# Patient Record
Sex: FEMALE | Race: BLACK OR AFRICAN AMERICAN | Employment: OTHER | URBAN - METROPOLITAN AREA
[De-identification: names, ages, dates, MRNs, and addresses within clinical notes are randomized per-mention and may not be internally consistent; named-entity substitution may affect disease eponyms.]

---

## 2017-11-24 ENCOUNTER — HOSPITAL ENCOUNTER (OUTPATIENT)
Dept: OTHER | Age: 72
Discharge: HOME | End: 2017-11-24
Attending: INTERNAL MEDICINE | Admitting: INTERNAL MEDICINE

## 2017-11-24 DIAGNOSIS — J18.9 PNEUMONIA DUE TO INFECTIOUS ORGANISM, UNSPECIFIED LATERALITY, UNSPECIFIED PART OF LUNG: ICD-10-CM

## 2019-07-11 ENCOUNTER — HOSPITAL ENCOUNTER (OUTPATIENT)
Dept: WOMENS IMAGING | Age: 74
Discharge: HOME | End: 2019-07-11
Payer: MEDICARE

## 2019-07-11 DIAGNOSIS — Z12.31 VISIT FOR SCREENING MAMMOGRAM: ICD-10-CM

## 2019-07-11 PROCEDURE — 77067 SCR MAMMO BI INCL CAD: CPT

## 2019-12-13 ENCOUNTER — HOSPITAL ENCOUNTER (OUTPATIENT)
Dept: GENERAL RADIOLOGY | Age: 74
Discharge: HOME | End: 2019-12-13
Payer: MEDICARE

## 2019-12-13 DIAGNOSIS — Z78.0 MENOPAUSE: ICD-10-CM

## 2019-12-13 PROCEDURE — 77080 DXA BONE DENSITY AXIAL: CPT

## 2022-06-21 ENCOUNTER — HOSPITAL ENCOUNTER (OUTPATIENT)
Dept: CT IMAGING | Age: 77
Discharge: HOME | End: 2022-06-21
Payer: MEDICARE

## 2022-06-21 DIAGNOSIS — M25.361 RIGHT KNEE BUCKLING: ICD-10-CM

## 2022-06-21 PROCEDURE — 73700 CT LOWER EXTREMITY W/O DYE: CPT

## 2022-11-17 ENCOUNTER — TELEPHONE (OUTPATIENT)
Dept: ORTHOPEDIC SURGERY | Age: 77
End: 2022-11-17

## 2022-11-17 NOTE — TELEPHONE ENCOUNTER
Orthopedic Nurse Navigator Summary    COVID:  Vaccinated and booster    Patient Name:  Chloé Martinez  Anticipated Date of Surgery:  11/30/22  Attended Pre-op Education Class:  Video sent to email  PCP: Sherie Yoder MD  Date of PCP visit for H&P: 11/17/22  Is patient in a Pain Management program:  Review of Medical history reveals history of: HTN, Anemia, Osteopenia    Critical Lab Values  - Hemoglobin (g/dL):  Date: 11/17/22 Value 12.0  - Hematocrit(%): Date: 11/17/22  Value 37.7  - HgbA1C:  Date:  Value  - Albumin:  Date: 11/17/22  Value 4.7  - BUN:  Date: 11/17/22  Value 16  - Creatinine:  Date: 11/17/22  Value 0.97    Coronary Artery Disease/HTN/CHF history: HTN- Patient sees Haseeb Lr MD for HTN- has appt 11/22/22  Cardiologist:  Cardiac clearance necessary:    Date of cardiac clearance appt:  Final Cardiac recommendations: On any anticoagulation: No    Diabetes History:  No  Most recent HgbA1C:  Pulmonary:  COPD/Emphysema/Use of home oxygen: No  Alcohol use: No    BMI greater than 40 at time of scheduling: Additional medical concerns:   Additional recommendations for above concerns:  Attended Pre-Hab program:    Anticipated Discharge Disposition:  Home with OPT  Who will be with patient at home following discharge:  her daughter lives with her  Equipment patient already has:  none  Bedroom on first or second floor:  first  Bathroom on first or second floor:  first  Weight bearing status:  wbat  Pre-op ambulatory status: painful ambulation  Number of entry steps:  none  Caregiver assistance:  full time    Emmie Simmons RN  Date:   11/17/22

## 2022-11-28 RX ORDER — POTASSIUM CHLORIDE 1500 MG/1
TABLET, FILM COATED, EXTENDED RELEASE ORAL
COMMUNITY
Start: 2022-09-16

## 2022-11-28 RX ORDER — ASPIRIN 81 MG/1
81 TABLET, CHEWABLE ORAL DAILY
COMMUNITY

## 2022-11-28 RX ORDER — ATENOLOL 100 MG/1
TABLET ORAL
COMMUNITY
Start: 2022-09-16

## 2022-11-28 RX ORDER — AMLODIPINE BESYLATE 10 MG/1
TABLET ORAL
COMMUNITY
Start: 2022-09-16

## 2022-11-28 NOTE — PROGRESS NOTES
Call to Dr Jackie Rivero office. Requested patient's H&P, EKG & lab results.  stated she will send them./jamil    11-29-22 @ [phone] - Called and requested preop testing,  stated it was faxed yesterday but will refax.  /MA    11-29-22 @ 18 - Called PCP office again for pre op testing. Verna Ruth

## 2022-11-28 NOTE — PROGRESS NOTES
[facility] PRE-SURGICAL TESTING INSTRUCTIONS                      PRIOR TO PROCEDURE DATE:    1. PLEASE FOLLOW ANY INSTRUCTIONS GIVEN TO YOU PER YOUR SURGEON. 2. Arrange for someone to drive you home and be with you for the first 24 hours after discharge for your safety after your procedure for which you received sedation. Ensure it is someone we can share information with regarding your discharge. NOTE: At this time ONLY 2 ADULTS may accompany you. MASKS are no longer required but they are encouraged. 3. You must contact your surgeon for instructions IF:  You are taking any blood thinners, aspirin, anti-inflammatory or vitamins. There is a change in your physical condition such as a cold, fever, rash, cuts, sores, or any other infection, especially near your surgical site. 4. Do not drink alcohol the day before or day of your procedure. Do not use any recreational marijuana at least 24 hours or street drugs (heroin, cocaine) at minimum 5 days prior to your procedure. 5. A Pre-Surgical History and Physical MUST be completed WITHIN 30 DAYS OR LESS prior to your procedure. by your Physician or an Urgent Care        THE DAY OF YOUR PROCEDURE:  1. Follow instructions for ARRIVAL TIME as DIRECTED BY YOUR SURGEON. 2. Enter the MAIN entrance from [address] and follow the signs to the free Parking [employer] or [employer] (offered free of charge 7 am-5pm). 3. Enter the Main Entrance of the hospital (do not enter from the lower level of the parking garage). Upon entrance, check in with the  at the surgical information desk on your LEFT. Bring your insurance card and photo ID to register      4. DO NOT EAT ANYTHING 8 hours prior to arrival for surgery. You may have up to 8 ounces of water 4 hours prior to your arrival for surgery.    NOTE: ALL Gastric, Bariatric & Bowel surgery patients - you MUST follow your surgeon's instructions regarding eating/ drinking as you will have very specific instructions to follow. If you did not receive these, call your surgeon's office immediately. 5. MEDICATIONS:  Take the following medications with a SMALL sip of water: AMLODIPINE & TENORMIN  Use your usual dose of inhalers the morning of surgery. BRING your rescue inhaler with you to hospital.   Anesthesia does NOT want you to take insulin the morning of surgery. They will control your blood sugar while you are at the hospital. Please contact your ordering physician for instructions regarding your insulin the night before your procedure. If you have an insulin pump, please keep it set on basal rate. Bariatric patient's call your surgeon if on diabetic medications as some may need to be stopped 1 week prior to surgery    6. Do not swallow additional water when brushing teeth. No gum, candy, mints, or ice chips. Refrain from smoking or at least decrease the amount on day of surgery. 7. Morning of surgery:   Take a shower with an antibacterial soap (i.e., Safeguard or Dial) OR your physician may have instructed you to use Hibiclens. Dress in loose, comfortable clothing appropriate for redressing after your procedure. Do not wear jewelry (including body piercings), NO make-up (especially NO eye make-up including no mascara; no false eyelashes). Please remove fingernail polish. If you have acrylic nails, please remove the polish off of one finger so the oxygen monitoring equipment can do its job. NO toenail polish if foot/leg surgery. Do not apply any lotion, powders or oils. Remove all metal hair clips. Do not shave or wax for 72 hours prior to procedure near your operative site. Shaving with a razor can irritate your skin and make it easier to develop an infection. On the day of your procedure, any hair that needs to be removed near the surgical site will be 'clipped' by a healthcare worker using a special clipper designed to avoid skin irritation.     8. Dentures, glasses, or contacts will need to be removed before your procedure. Bring cases for your glasses, contacts, dentures, or hearing aids to protect them while you are in surgery. 9. If you use a CPAP, please bring it with you on the day of your procedure. 10. We recommend that valuable personal belongings such as cash, cell phones, e-tablets, or jewelry, be left at home during your stay. The hospital will not be responsible for valuables that are not secured in the hospital safe. However, if your insurance requires a co-pay, you may want to bring a method of payment, i.e., Check or credit card, if you wish to pay your co-pay the day of surgery. 11. If you are to stay overnight, you may bring a bag with personal items. Please have any large items you may need brought in by your family after your arrival to your hospital room. 12. If you have a Living Will or Durable Power of , please bring a copy on the day of your procedure. How we keep you safe and work to prevent surgical site infections:   1. Health care workers should always check your ID bracelet to verify your name and birth date. You will be asked many times to state your name, date of birth, and allergies. 2. Health care workers should always clean their hands with soap or alcohol gel before providing care to you. It is okay to ask anyone if they cleaned their hands before they touch you. 3. You will be actively involved in verifying the type of procedure you are having and ensuring the correct surgical site. This will be confirmed multiple times prior to your procedure. Do NOT reyes your surgery site UNLESS instructed to by your surgeon. 4. When you are in the operating room, your surgical site will be cleansed with a special soap, and in most cases, you will be given an antibiotic before the surgery begins. What to expect AFTER your procedure? 1.  Immediately following your procedure, your will be taken to the PACU for the first phase of your recovery. Your nurse will help you recover from any potential side effects of anesthesia, such as extreme drowsiness, changes in your vital signs or breathing patterns. Nausea, headache, muscle aches, or sore throat may also occur after anesthesia. Your nurse will help you manage these potential side effects. 2. For comfort and safety, arrange to have someone at home with you for the first 24 hours after discharge. 3. You and your family will be given written instructions about your diet, activity, dressing care, medications, and return visits. 4. Once at home, should issues with nausea, pain, or bleeding occur, or should you notice any signs of infection, you should call your surgeon. 5. Always clean your hands before and after caring for your wound. Do not let your family touch your surgery site without cleaning their hands. 6. Narcotic pain medications can cause significant constipation. You may want to add a stool softener to your postoperative medication schedule or speak to your surgeon on how best to manage this SIDE EFFECT. SPECIAL INSTRUCTIONS: FOLLOW ALL INSTRUCTIONS AS GIVEN TO YOU BY YOUR SURGEON    Thank you for allowing us to care for you. We strive to exceed your expectations in the delivery of care and service provided to you and your family. If you need to contact the [place] [address] staff for any reason, please call us at [phone]    Instructions reviewed with patient during preadmission testing phone interview.   Emma Mtz RN.11/28/2022 .9:12 AM      ADDITIONAL EDUCATIONAL INFORMATION REVIEWED PER PHONE WITH YOU AND/OR YOUR FAMILY:  Yes Hibiclens® Bathing Instructions   No Antibacterial Soap

## 2022-11-28 NOTE — PROGRESS NOTES
Place patient label inside box (if no patient label, complete below)  Name:  :  MR#:       Christian Brandt / PROCEDURE  I (we), Tonie Kayser  (Patient Name) authorize DR Ryan Morataya MD  (Provider / Cristal Galan) and/or such assistants as may be selected by him/her, to perform the following operation/procedure(s): RIGHT KNEE REVISION, POLY EXCHANGE       Note: If unable to obtain consent prior to an emergent procedure, document the emergent reason in the medical record. This procedure has been explained to my (our) satisfaction and included in the explanation was: The intended benefit, nature, and extent of the procedure to be performed; The significant risks involved and the probability of success; Alternative procedures and methods of treatment; The dangers and probable consequences of such alternatives (including no procedure or treatment); The expected consequences of the procedure on my future health; Whether other qualified individuals would be performing important surgical tasks and/or whether  would be present to advise or support the procedure. I (we) understand that there are other risks of infection and other serious complications in the pre-operative/procedural and postoperative/procedural stages of my (our) care. I (we) have asked all of the questions which I (we) thought were important in deciding whether or not to undergo treatment or diagnosis. These questions have been answered to my (our) satisfaction. I (we) understand that no assurance can be given that the procedure will be a success, and no guarantee or warranty of success has been given to me (us). It has been explained to me (us) that during the course of the operation/procedure, unforeseen conditions may be revealed that necessitate extension of the original procedure(s) or different procedure(s) than those set forth in Paragraph 1.  I (we) authorize and request that the above-named physician, his/her assistants or his/her designees, perform procedures as necessary and desirable if deemed to be in my (our) best interest.     Revised 8/2/2021                                                                          Page 1 of 2         I acknowledge that health care personnel may be observing this procedure for the purpose of medical education or other specified purposes as may be necessary as requested and/or approved by my (our) physician. I (we) consent to the disposal by the hospital Pathologist of the removed tissue, parts or organs in accordance with hospital policy. I do ____ do not ____ consent to the use of a local infiltration pain blocking agent that will be used by my provider/surgical provider to help alleviate pain during my procedure. I do ____ do not ____ consent to an emergent blood transfusion in the case of a life-threatening situation that requires blood components to be administered. This consent is valid for 24 hours from the beginning of the procedure. This patient does ____ or does not ____ currently have a DNR status/order. If DNR order is in place, obtain Addendum to the Surgical Consent for ALL Patients with a DNR Order to address alisia-operative status for limited intervention or DNR suspension.      I have read and fully understand the above Consent for Operation/Procedure and that all blanks were completed before I signed the consent.   _____________________________       _____________________      ____/____am/pm  Signature of Patient or legal representative      Printed Name / Relationship            Date / Time   ____________________________       _____________________      ____/____am/pm  Witness to Signature                                    Printed Name                    Date / Time    If patient is unable to sign or is a minor, complete the following)  Patient is a minor, ____ years of age, or unable to sign because: ______________________________________________________________________________________________    If a phone consent is obtained, consent will be documented by using two health care professionals, each affirming that the consenting party has no questions and gives consent for the procedure discussed with the physician/provider.   _____________________          ____________________       _____/_____am/pm   2nd witness to phone consent        Printed name           Date / Time    Informed Consent:  I have provided the explanation described above in section 1 to the patient and/or legal representative.  I have provided the patient and/or legal representative with an opportunity to ask any questions about the proposed operation/procedure.   ___________________________          ____________________         ____/____am/pm  Provider / Proceduralist                            Printed name            Date / Time  Revised 8/2/2021                                                                      Page 2 of 2

## 2022-11-30 ENCOUNTER — ANESTHESIA EVENT (OUTPATIENT)
Dept: OPERATING ROOM | Age: 77
End: 2022-11-30
Payer: MEDICARE

## 2022-11-30 ENCOUNTER — ANESTHESIA (OUTPATIENT)
Dept: OPERATING ROOM | Age: 77
End: 2022-11-30
Payer: MEDICARE

## 2022-11-30 ENCOUNTER — HOSPITAL ENCOUNTER (OUTPATIENT)
Age: 77
Setting detail: SURGERY ADMIT
Discharge: HOME | End: 2022-11-30
Attending: ORTHOPAEDIC SURGERY | Admitting: ORTHOPAEDIC SURGERY
Payer: MEDICARE

## 2022-11-30 VITALS
HEART RATE: 61 BPM | WEIGHT: 180 LBS | DIASTOLIC BLOOD PRESSURE: 71 MMHG | SYSTOLIC BLOOD PRESSURE: 136 MMHG | RESPIRATION RATE: 16 BRPM | TEMPERATURE: 98 F | HEIGHT: 62 IN | OXYGEN SATURATION: 94 % | BODY MASS INDEX: 33.13 KG/M2

## 2022-11-30 DIAGNOSIS — Z96.651 HISTORY OF TOTAL RIGHT KNEE REPLACEMENT: Primary | ICD-10-CM

## 2022-11-30 PROCEDURE — 2500000003 HC RX 250 WO HCPCS: Performed by: ORTHOPAEDIC SURGERY

## 2022-11-30 PROCEDURE — 3600000004 HC SURGERY LEVEL 4 BASE: Performed by: ORTHOPAEDIC SURGERY

## 2022-11-30 PROCEDURE — A4217 STERILE WATER/SALINE, 500 ML: HCPCS | Performed by: ORTHOPAEDIC SURGERY

## 2022-11-30 PROCEDURE — 7100000010 HC PHASE II RECOVERY - FIRST 15 MIN: Performed by: ORTHOPAEDIC SURGERY

## 2022-11-30 PROCEDURE — 3600000014 HC SURGERY LEVEL 4 ADDTL 15MIN: Performed by: ORTHOPAEDIC SURGERY

## 2022-11-30 PROCEDURE — 7100000001 HC PACU RECOVERY - ADDTL 15 MIN: Performed by: ORTHOPAEDIC SURGERY

## 2022-11-30 PROCEDURE — 2580000003 HC RX 258: Performed by: ANESTHESIOLOGY

## 2022-11-30 PROCEDURE — 2580000003 HC RX 258: Performed by: ORTHOPAEDIC SURGERY

## 2022-11-30 PROCEDURE — 7100000011 HC PHASE II RECOVERY - ADDTL 15 MIN: Performed by: ORTHOPAEDIC SURGERY

## 2022-11-30 PROCEDURE — 3700000001 HC ADD 15 MINUTES (ANESTHESIA): Performed by: ORTHOPAEDIC SURGERY

## 2022-11-30 PROCEDURE — 2500000003 HC RX 250 WO HCPCS: Performed by: NURSE ANESTHETIST, CERTIFIED REGISTERED

## 2022-11-30 PROCEDURE — 0SPV0JZ REMOVAL OF SYNTHETIC SUBSTITUTE FROM RIGHT KNEE JOINT, TIBIAL SURFACE, OPEN APPROACH: ICD-10-PCS | Performed by: ORTHOPAEDIC SURGERY

## 2022-11-30 PROCEDURE — 6360000002 HC RX W HCPCS: Performed by: NURSE ANESTHETIST, CERTIFIED REGISTERED

## 2022-11-30 PROCEDURE — 3700000000 HC ANESTHESIA ATTENDED CARE: Performed by: ORTHOPAEDIC SURGERY

## 2022-11-30 PROCEDURE — 1200000000 HC SEMI PRIVATE

## 2022-11-30 PROCEDURE — 7100000000 HC PACU RECOVERY - FIRST 15 MIN: Performed by: ORTHOPAEDIC SURGERY

## 2022-11-30 PROCEDURE — 0SRV0JA REPLACEMENT OF RIGHT KNEE JOINT, TIBIAL SURFACE WITH SYNTHETIC SUBSTITUTE, UNCEMENTED, OPEN APPROACH: ICD-10-PCS | Performed by: ORTHOPAEDIC SURGERY

## 2022-11-30 PROCEDURE — 2720000010 HC SURG SUPPLY STERILE: Performed by: ORTHOPAEDIC SURGERY

## 2022-11-30 PROCEDURE — 6360000002 HC RX W HCPCS: Performed by: ORTHOPAEDIC SURGERY

## 2022-11-30 PROCEDURE — 6370000000 HC RX 637 (ALT 250 FOR IP): Performed by: ORTHOPAEDIC SURGERY

## 2022-11-30 PROCEDURE — C1776 JOINT DEVICE (IMPLANTABLE): HCPCS | Performed by: ORTHOPAEDIC SURGERY

## 2022-11-30 PROCEDURE — 2709999900 HC NON-CHARGEABLE SUPPLY: Performed by: ORTHOPAEDIC SURGERY

## 2022-11-30 DEVICE — REVISION JOURNEY ARTICULAR INSERT                                    BCS STD 3-4 RT 10MM
Type: IMPLANTABLE DEVICE | Site: KNEE | Status: FUNCTIONAL
Brand: JOURNEY

## 2022-11-30 RX ORDER — OXYCODONE HYDROCHLORIDE 5 MG/1
5 TABLET ORAL PRN
Status: DISCONTINUED | OUTPATIENT
Start: 2022-11-30 | End: 2022-11-30 | Stop reason: HOSPADM

## 2022-11-30 RX ORDER — DIPHENHYDRAMINE HYDROCHLORIDE 50 MG/ML
12.5 INJECTION INTRAMUSCULAR; INTRAVENOUS
Status: DISCONTINUED | OUTPATIENT
Start: 2022-11-30 | End: 2022-11-30 | Stop reason: HOSPADM

## 2022-11-30 RX ORDER — OXYCODONE HYDROCHLORIDE 5 MG/1
5 TABLET ORAL EVERY 6 HOURS PRN
Qty: 28 TABLET | Refills: 0 | Status: SHIPPED | OUTPATIENT
Start: 2022-11-30 | End: 2022-12-07

## 2022-11-30 RX ORDER — PROPOFOL 10 MG/ML
INJECTION, EMULSION INTRAVENOUS PRN
Status: DISCONTINUED | OUTPATIENT
Start: 2022-11-30 | End: 2022-11-30 | Stop reason: SDUPTHER

## 2022-11-30 RX ORDER — ONDANSETRON 2 MG/ML
INJECTION INTRAMUSCULAR; INTRAVENOUS PRN
Status: DISCONTINUED | OUTPATIENT
Start: 2022-11-30 | End: 2022-11-30 | Stop reason: SDUPTHER

## 2022-11-30 RX ORDER — MEPERIDINE HYDROCHLORIDE 25 MG/ML
12.5 INJECTION INTRAMUSCULAR; INTRAVENOUS; SUBCUTANEOUS EVERY 5 MIN PRN
Status: DISCONTINUED | OUTPATIENT
Start: 2022-11-30 | End: 2022-11-30 | Stop reason: HOSPADM

## 2022-11-30 RX ORDER — AMOXICILLIN 250 MG
2 CAPSULE ORAL DAILY PRN
Qty: 30 TABLET | Refills: 2 | Status: SHIPPED | OUTPATIENT
Start: 2022-11-30

## 2022-11-30 RX ORDER — DEXAMETHASONE SODIUM PHOSPHATE 10 MG/ML
10 INJECTION, SOLUTION INTRAMUSCULAR; INTRAVENOUS ONCE
Status: COMPLETED | OUTPATIENT
Start: 2022-11-30 | End: 2022-11-30

## 2022-11-30 RX ORDER — LABETALOL HYDROCHLORIDE 5 MG/ML
10 INJECTION, SOLUTION INTRAVENOUS
Status: DISCONTINUED | OUTPATIENT
Start: 2022-11-30 | End: 2022-11-30 | Stop reason: HOSPADM

## 2022-11-30 RX ORDER — OXYCODONE HYDROCHLORIDE 5 MG/1
10 TABLET ORAL PRN
Status: DISCONTINUED | OUTPATIENT
Start: 2022-11-30 | End: 2022-11-30 | Stop reason: HOSPADM

## 2022-11-30 RX ORDER — SODIUM CHLORIDE, SODIUM LACTATE, POTASSIUM CHLORIDE, CALCIUM CHLORIDE 600; 310; 30; 20 MG/100ML; MG/100ML; MG/100ML; MG/100ML
INJECTION, SOLUTION INTRAVENOUS CONTINUOUS
Status: DISCONTINUED | OUTPATIENT
Start: 2022-11-30 | End: 2022-11-30 | Stop reason: HOSPADM

## 2022-11-30 RX ORDER — DEXAMETHASONE SODIUM PHOSPHATE 4 MG/ML
INJECTION, SOLUTION INTRA-ARTICULAR; INTRALESIONAL; INTRAMUSCULAR; INTRAVENOUS; SOFT TISSUE PRN
Status: DISCONTINUED | OUTPATIENT
Start: 2022-11-30 | End: 2022-11-30 | Stop reason: SDUPTHER

## 2022-11-30 RX ORDER — CELECOXIB 200 MG/1
400 CAPSULE ORAL ONCE
Status: COMPLETED | OUTPATIENT
Start: 2022-11-30 | End: 2022-11-30

## 2022-11-30 RX ORDER — SODIUM CHLORIDE 9 MG/ML
INJECTION, SOLUTION INTRAVENOUS PRN
Status: DISCONTINUED | OUTPATIENT
Start: 2022-11-30 | End: 2022-11-30 | Stop reason: HOSPADM

## 2022-11-30 RX ORDER — HYDRALAZINE HYDROCHLORIDE 20 MG/ML
10 INJECTION INTRAMUSCULAR; INTRAVENOUS
Status: DISCONTINUED | OUTPATIENT
Start: 2022-11-30 | End: 2022-11-30 | Stop reason: HOSPADM

## 2022-11-30 RX ORDER — MIDAZOLAM HYDROCHLORIDE 1 MG/ML
INJECTION INTRAMUSCULAR; INTRAVENOUS PRN
Status: DISCONTINUED | OUTPATIENT
Start: 2022-11-30 | End: 2022-11-30 | Stop reason: SDUPTHER

## 2022-11-30 RX ORDER — CEFADROXIL 500 MG/1
500 CAPSULE ORAL 2 TIMES DAILY
Qty: 14 CAPSULE | Refills: 0 | Status: SHIPPED | OUTPATIENT
Start: 2022-11-30 | End: 2022-12-07

## 2022-11-30 RX ORDER — SODIUM CHLORIDE 0.9 % (FLUSH) 0.9 %
5-40 SYRINGE (ML) INJECTION PRN
Status: DISCONTINUED | OUTPATIENT
Start: 2022-11-30 | End: 2022-11-30 | Stop reason: HOSPADM

## 2022-11-30 RX ORDER — ROCURONIUM BROMIDE 10 MG/ML
INJECTION, SOLUTION INTRAVENOUS PRN
Status: DISCONTINUED | OUTPATIENT
Start: 2022-11-30 | End: 2022-11-30 | Stop reason: SDUPTHER

## 2022-11-30 RX ORDER — SODIUM CHLORIDE 9 MG/ML
25 INJECTION, SOLUTION INTRAVENOUS PRN
Status: DISCONTINUED | OUTPATIENT
Start: 2022-11-30 | End: 2022-11-30 | Stop reason: HOSPADM

## 2022-11-30 RX ORDER — MAGNESIUM HYDROXIDE 1200 MG/15ML
LIQUID ORAL CONTINUOUS PRN
Status: COMPLETED | OUTPATIENT
Start: 2022-11-30 | End: 2022-11-30

## 2022-11-30 RX ORDER — GABAPENTIN 300 MG/1
300 CAPSULE ORAL ONCE
Status: COMPLETED | OUTPATIENT
Start: 2022-11-30 | End: 2022-11-30

## 2022-11-30 RX ORDER — GLYCOPYRROLATE 0.2 MG/ML
INJECTION INTRAMUSCULAR; INTRAVENOUS PRN
Status: DISCONTINUED | OUTPATIENT
Start: 2022-11-30 | End: 2022-11-30 | Stop reason: SDUPTHER

## 2022-11-30 RX ORDER — FENTANYL CITRATE 50 UG/ML
INJECTION, SOLUTION INTRAMUSCULAR; INTRAVENOUS PRN
Status: DISCONTINUED | OUTPATIENT
Start: 2022-11-30 | End: 2022-11-30 | Stop reason: SDUPTHER

## 2022-11-30 RX ORDER — SODIUM CHLORIDE 0.9 % (FLUSH) 0.9 %
5-40 SYRINGE (ML) INJECTION EVERY 12 HOURS SCHEDULED
Status: DISCONTINUED | OUTPATIENT
Start: 2022-11-30 | End: 2022-11-30 | Stop reason: HOSPADM

## 2022-11-30 RX ORDER — METOCLOPRAMIDE HYDROCHLORIDE 5 MG/ML
10 INJECTION INTRAMUSCULAR; INTRAVENOUS
Status: DISCONTINUED | OUTPATIENT
Start: 2022-11-30 | End: 2022-11-30 | Stop reason: HOSPADM

## 2022-11-30 RX ORDER — ASPIRIN 81 MG/1
81 TABLET ORAL 2 TIMES DAILY
Qty: 60 TABLET | Refills: 0 | Status: SHIPPED | OUTPATIENT
Start: 2022-11-30

## 2022-11-30 RX ORDER — LIDOCAINE HYDROCHLORIDE 10 MG/ML
1 INJECTION, SOLUTION EPIDURAL; INFILTRATION; INTRACAUDAL; PERINEURAL
Status: DISCONTINUED | OUTPATIENT
Start: 2022-11-30 | End: 2022-11-30 | Stop reason: HOSPADM

## 2022-11-30 RX ORDER — ACETAMINOPHEN 500 MG
1000 TABLET ORAL ONCE
Status: COMPLETED | OUTPATIENT
Start: 2022-11-30 | End: 2022-11-30

## 2022-11-30 RX ORDER — LIDOCAINE HYDROCHLORIDE 20 MG/ML
INJECTION, SOLUTION INTRAVENOUS PRN
Status: DISCONTINUED | OUTPATIENT
Start: 2022-11-30 | End: 2022-11-30 | Stop reason: SDUPTHER

## 2022-11-30 RX ADMIN — SODIUM CHLORIDE, POTASSIUM CHLORIDE, SODIUM LACTATE AND CALCIUM CHLORIDE: 600; 310; 30; 20 INJECTION, SOLUTION INTRAVENOUS at 08:58

## 2022-11-30 RX ADMIN — MIDAZOLAM HYDROCHLORIDE 2 MG: 2 INJECTION, SOLUTION INTRAMUSCULAR; INTRAVENOUS at 07:40

## 2022-11-30 RX ADMIN — ACETAMINOPHEN 1000 MG: 500 TABLET ORAL at 06:36

## 2022-11-30 RX ADMIN — GABAPENTIN 300 MG: 300 CAPSULE ORAL at 06:36

## 2022-11-30 RX ADMIN — CEFAZOLIN 2000 MG: 2 INJECTION, POWDER, FOR SOLUTION INTRAMUSCULAR; INTRAVENOUS at 07:40

## 2022-11-30 RX ADMIN — ROCURONIUM BROMIDE 50 MG: 10 INJECTION INTRAVENOUS at 07:44

## 2022-11-30 RX ADMIN — TRANEXAMIC ACID 1000 MG: 1 INJECTION, SOLUTION INTRAVENOUS at 07:54

## 2022-11-30 RX ADMIN — SODIUM CHLORIDE, POTASSIUM CHLORIDE, SODIUM LACTATE AND CALCIUM CHLORIDE: 600; 310; 30; 20 INJECTION, SOLUTION INTRAVENOUS at 06:47

## 2022-11-30 RX ADMIN — PROPOFOL 150 MG: 10 INJECTION, EMULSION INTRAVENOUS at 07:44

## 2022-11-30 RX ADMIN — DEXAMETHASONE SODIUM PHOSPHATE 4 MG: 4 INJECTION, SOLUTION INTRAMUSCULAR; INTRAVENOUS at 07:52

## 2022-11-30 RX ADMIN — ONDANSETRON 4 MG: 2 INJECTION INTRAMUSCULAR; INTRAVENOUS at 07:52

## 2022-11-30 RX ADMIN — LIDOCAINE HYDROCHLORIDE 50 MG: 20 INJECTION, SOLUTION INTRAVENOUS at 07:43

## 2022-11-30 RX ADMIN — CELECOXIB 400 MG: 200 CAPSULE ORAL at 06:36

## 2022-11-30 RX ADMIN — DEXAMETHASONE SODIUM PHOSPHATE 10 MG: 10 INJECTION, SOLUTION INTRAMUSCULAR; INTRAVENOUS at 06:47

## 2022-11-30 RX ADMIN — SUGAMMADEX 200 MG: 100 INJECTION, SOLUTION INTRAVENOUS at 09:02

## 2022-11-30 RX ADMIN — GLYCOPYRROLATE 0.2 MG: 0.2 INJECTION INTRAMUSCULAR; INTRAVENOUS at 07:42

## 2022-11-30 RX ADMIN — FENTANYL CITRATE 50 MCG: 50 INJECTION, SOLUTION INTRAMUSCULAR; INTRAVENOUS at 07:45

## 2022-11-30 ASSESSMENT — PAIN SCALES - GENERAL
PAINLEVEL_OUTOF10: 4
PAINLEVEL_OUTOF10: 3
PAINLEVEL_OUTOF10: 0
PAINLEVEL_OUTOF10: 4

## 2022-11-30 ASSESSMENT — PAIN - FUNCTIONAL ASSESSMENT
PAIN_FUNCTIONAL_ASSESSMENT: PREVENTS OR INTERFERES SOME ACTIVE ACTIVITIES AND ADLS
PAIN_FUNCTIONAL_ASSESSMENT: PREVENTS OR INTERFERES SOME ACTIVE ACTIVITIES AND ADLS
PAIN_FUNCTIONAL_ASSESSMENT: 0-10

## 2022-11-30 ASSESSMENT — PAIN DESCRIPTION - PAIN TYPE: TYPE: SURGICAL PAIN

## 2022-11-30 ASSESSMENT — PAIN DESCRIPTION - ORIENTATION: ORIENTATION: RIGHT

## 2022-11-30 ASSESSMENT — PAIN DESCRIPTION - DESCRIPTORS: DESCRIPTORS: DISCOMFORT;SORE

## 2022-11-30 ASSESSMENT — PAIN DESCRIPTION - FREQUENCY: FREQUENCY: CONTINUOUS

## 2022-11-30 ASSESSMENT — PAIN DESCRIPTION - LOCATION: LOCATION: KNEE

## 2022-11-30 NOTE — PROGRESS NOTES
Updated patient's daughter in family waiting room at 57 on patient's status as well as plan moving forward for discharge.

## 2022-11-30 NOTE — H&P
Donita Diaz    [id number]    [facility] Same Day Surgery Update H & P  Department of General Surgery   Surgical Service   Pre-operative History and Physical  Last H & P within the last 30 days. 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 female with c/o right knee pain and instability in the setting of previous TKA. Their symptoms have been recalcitrant to conservative treatment and the patient presents today for the above procedure. Illness Screening: Patient denies fever, chills, worsening cough, or close contact with sick individuals. Past Medical History:        Diagnosis Date    Anemia     IRON DEFICIENCY    Anxiety     Chronic pain     Depression     Hemorrhoids     Hypertension     OA (osteoarthritis)     Varicose veins      Past Surgical History:        Procedure Laterality Date    COLONOSCOPY      JOINT REPLACEMENT Right     KNEE ARTHROSCOPY      RIGHT    SHOULDER ARTHROSCOPY Right 03/06/2013    ROTATOR CUFF TEAR. SUPERIOR LABRUM ANTERIOR POSTERIOR TEAR    TOTAL KNEE ARTHROPLASTY             Medications Prior to Admission:      Prior to Admission medications    Medication Sig Start Date End Date Taking?  Authorizing Provider   amLODIPine (NORVASC) 10 MG tablet TAKE 1 TABLET BY MOUTH DAILY 9/16/22   Historical Provider, MD   atenolol (TENORMIN) 100 MG tablet TAKE 1 TABLET BY MOUTH EVERY DAY 9/16/22   Historical Provider, MD   aspirin 81 MG chewable tablet Take 81 mg by mouth daily    Historical Provider, MD   potassium chloride (KLOR-CON M) 20 MEQ TBCR extended release tablet TAKE 1 TABLET BY MOUTH TWICE DAILY 9/16/22   Historical Provider, MD   Multiple Vitamins-Minerals (CENTRUM SILVER ULTRA WOMENS PO) Take by mouth    Historical Provider, MD   vitamin D3 (CHOLECALCIFEROL) 400 UNITS TABS tablet Take 400 Units by mouth daily    Historical Provider, MD   cyclobenzaprine (FLEXERIL) 10 MG tablet Take 1 tablet by mouth 2 times daily as needed for Muscle spasms 7/10/15   Bhavna Harris MD   triamterene-hydrochlorothiazide [facility]) 37.5-25 MG per tablet TAKE 1 TABLET BY MOUTH DAILY 7/10/15   Bhavna Harris MD   PARoxetine (PAXIL) 20 MG tablet Take 1 tablet by mouth every morning 7/10/15   Bhavna Harris MD   calcium carbonate-vitamin D 600-200 MG-UNIT TABS Take 1 tablet by mouth daily 7/10/15   Bhavna Harris MD   ferrous sulfate 325 (65 FE) MG tablet Take 1 tablet by mouth daily (with breakfast) 7/10/15   Bhavna Harris MD         Allergies:  Lisinopril and Metronidazole    PHYSICAL EXAM:      BP (!) 158/86   Pulse 56   Temp 96.9 °F (36.1 °C) (Temporal)   Resp 15   Ht 5' 2\" (1.575 m)   Wt 180 lb (81.6 kg)   SpO2 98%   BMI 32.92 kg/m²      Airway:  Airway patent with no audible stridor    Heart:  Regular rate and rhythm, No murmur noted    Lungs:  No increased work of breathing, good air exchange, clear to auscultation bilaterally, no crackles or wheezing    Abdomen:  Soft, non-distended, non-tender, no masses palpated    ASSESSMENT AND PLAN    Patient is a 69 y.o. female with above specified procedure planned. 1.  The patients history and physical was obtained and signed off by the pre-admission testing department. Patient seen and focused exam done today- no new changes since last physical exam on 11/17/22    2. Access to ancillary services are available per request of the provider.     AGATA Be - CNP     11/30/2022

## 2022-11-30 NOTE — PROGRESS NOTES
Patient admitted to PACU #17 per bed at 0919 s/p RIGHT KNEE REVISION, POLY EXCHANGE - Right. Report received at bedside in PACU per CRNA and OR nurse. Patient was reported to be hemodynamically stable in OR with no complications. Patient connected to PACU monitoring equipment. IVF's infusing with site unremarkable. Patient arrived to PACU with oral airway in place and a NRB and not responsive from anesthesia but with respirations easy and even but shallow with no pain noted. Right knee surgical dressing with DSD and Ace Wrap remains C,D,I with no drainage noted. Ice packs applied. No further changes. Will continue to monitor.

## 2022-11-30 NOTE — ANESTHESIA POSTPROCEDURE EVALUATION
Department of Anesthesiology  Postprocedure Note    Patient:  Tre Verdin  MRN: [id number]  YOB: 1945  Date of evaluation: 11/30/2022      Procedure Summary     Date: 11/30/22 Room / Location: [address] [place] [address] [address] [place] [address] / [facility]    Anesthesia Start: 4812 Anesthesia Stop: 1156    Procedure: RIGHT KNEE REVISION, POLY EXCHANGE (Right: Knee) Diagnosis:       Unstable right knee      History of total right knee replacement      (Unstable right knee [M25.361])      (History of total right knee replacement [Z96.651])    Surgeons: Gina Rose MD Responsible Provider: Jaciel Love MD    Anesthesia Type: General ASA Status: 3          Anesthesia Type: General    Liam Phase I: Liam Score: 10    Liam Phase II: Liam Score: 10      Anesthesia Post Evaluation    Patient location during evaluation: PACU  Patient participation: complete - patient participated  Level of consciousness: awake and alert  Pain score: 0  Airway patency: patent  Nausea & Vomiting: no nausea and no vomiting  Complications: no  Cardiovascular status: hemodynamically stable  Respiratory status: acceptable  Hydration status: euvolemic

## 2022-11-30 NOTE — OP NOTE
Taz Dallas MD  [place] Office: [address], [address], [address] [facility] ([id number]) [place]        Revision TKA - Tibial Poly only     Peyman Mo  :   PCP: Fam Wan MD    Surgery Date: 2022      PREOPERATIVE DIAGNOSIS: Right knee failed hardware (Broken post on TKA polyethylene.)    POSTOPERATIVE DIAGNOSIS: Same    PROCEDURES PERFORMED:  Right    Revision knee arthroplasty - Tibial component only (CPT 95180)    ANESTHESIA: General anesthesia with field block    ANTIBIOTIC: CEFAZOLIN, DOSING PER NURSING CHART, 1g incisional vancomycin    SURGEON: Sarita Ngueyn MD    Assistant: Surgical Assistant: Nga Aguirre. Needed for reduction, limb manipulation, tissue retraction, and wound closure as indicated. IMPLANTS:  Altagracia Clifford and jazmine Revision Journey BCS standard size 3-4 Right - 10mm    * No implants in log *    COMPLICATIONS: None    POSTOPERATIVE CONDITION:  Stable to PACU.     ESTIMATED BLOOD LOSS: 50 mL    FINDINGS:  [employer] journey with a broken polyethylene post    HISTORY OF PRESENT ILLNESS: The patient is a 69 y.o. female who presented with significant posterior laxity to clinic with after total knee arthroplasty initially done in  by Dr. Gokul Gabriel.  She been doing great up until recently when she started having some instability symptoms, particularly worse with going downhill. On exam she was found to have significant posterior drawer with no endpoint despite having a posterior stabilized type implant. This is a known issue with the journey. The patient was consented for the above-noted procedure and agreed to proceed. All questions were answered. The patient was seen in the pre-operative holding area, the operative extremity was marked. Appropriate pre-operative antibiotics were given.       RISKS OF SURGERY:  I had an extensive conversation with the patient and/or family regarding the risks, benefits, potential complication and reasonable expectations of the planned procedure. Informed verbal consent was given under no distress. We had ample opportunities for questions regarding the usual outcomes. Risks specifically discussed, but not limited to the following, include possible: bleeding, blood clots, anesthesia complications, nerve and blood vessel injury, hardware breakage or failure, need for additional surgery, and post-operative stiffness. It is possible that it could fail again. In addition, in rare cases the patient may have loss of a limb or even death. No guarantee of any particular results were given. The patient understands that in some cases surgery can make them worse. The patient and family desired that we proceed at the soonest possible time with the operation and expressed clear understanding. DETAILS OF PROCEDURE:  The patient was taken back to operating room, anesthetic was induced and the patient was positioned supine. All bony prominences were appropriately padded. The extremity was sterilely prepped and draped in the usual fashion. A formal time-out was performed before the start of the case, verifying the appropriate surgical site, procedure, antibiotic administration. Limb was exanguinated with esmarch, and tourniquet was inflated to 250mmHg for a total of ~25 minutes. Previous scar was excised. Full-thickness skin flaps were raised through the bursal layer. Previous arthrotomy was easily identified by several Ethibond sutures. Medial parapatellar arthrotomy was utilized. Scar was excised to allow for adequate mobilization of the patella as well as a slight medial release to allow access to the polyethylene. Once adequate exposure was obtained polyethylene was extracted. Component was examined and found to in fact have a broken polyethylene post.  This was cleared. There was no significant debris within the joint. No evidence of infection or other unexpected findings. Polyethylene was extracted and found to be a size 3 tibial tray. A 10 mm polyspacer. We trialed a size 10 mm polytrial which provided excellent stability and balance in the coronal and sagittal planes. We irrigated thoroughly and placed the final polyspacer uneventfully. Tourniquet was let down and hemostasis was obtained. The wounds were irrigated with copious amounts of normal saline, closed in layers and they were sterilely dressed. The patient was returned to PACU in stable condition with good perfusion of the operative extremity. There were no identified interoperative complications. At the end of the case the instrument count was correct. POST OP PLAN  ASA BID for DVT ppx  Duricef x1 week  Oxycodone  Weight bearing as tolerated  Follow up in about a week for wound check          MARY Aguilar MD  [facility]  Office: [phone]  Cell: [phone]    11/30/22  8:47 AM

## 2022-11-30 NOTE — PROGRESS NOTES
PACU Transfer to [facility]    Procedure(s):  RIGHT KNEE REVISION, POLY EXCHANGE    Pt's Current Allergies: Lisinopril and Metronidazole    Pt meets criteria to transfer to next phase of care per REI SCORE and ASPAN standards    No results for input(s): POCGLU in the last 72 hours. Vitals:    11/30/22 1112   BP: 127/73   Pulse: 60   Resp: 15   Temp: 97.4 °F (36.3 °C)   SpO2: 99%      BP within 20% of pt's admitting BP as per Rei Score      Intake/Output Summary (Last 24 hours) at 11/30/2022 1115  Last data filed at 11/30/2022 1112  Gross per 24 hour   Intake 1460 ml   Output 50 ml   Net 1410 ml       Pain assessment:  present - adequately treated  Pain Level: 4    Patient was assessed for unknown alterations to skin integrity. There were not unknown alterations observed. Walker sent home with patient. Patient transferred to care of Alex Yates RN.    Family updated and directed to Alex Yates    11/30/2022 11:15 AM

## 2022-11-30 NOTE — PROGRESS NOTES
Ambulatory Surgery/Procedure Discharge Note    Vitals:    11/30/22 1139   BP:    Pulse: 61   Resp:    Temp:    SpO2: 94%     See VS flowsheet for further VS. Patient meets criteria for discharge per Liam score. In: 8350 [P.O.:60; I.V.:1290]  Out: 50     Restroom use offered before discharge. Yes    Pain assessment:  present - adequately treated  Pain Level: 4    Pt and family states \"ready to go home\". Pt alert and oriented x4. IV removed. Denies N/V. Right knee ACE bandage dressing-C,D,I. Patient toes warm. Discharge instructions given to pt and family with pt permission. Pt and family verbalized understanding of all instructions. Left with all belongings, prescriptions, and discharge instructions. Patient discharged to home/self care. Patient discharged via wheel chair by transporter to waiting family.        11/30/2022 11:58 AM

## 2022-11-30 NOTE — ANESTHESIA PRE PROCEDURE
Department of Anesthesiology  Preprocedure Note       Name:  Monty Prasad   Age:  69 y.o.  :  1945                                          MRN:  [id number]         Date:  2022      Surgeon: Rodney Mcconnell):  Earline Bautista MD    Procedure: Procedure(s):  RIGHT KNEE REVISION, POLY EXCHANGE    Medications prior to admission:   Prior to Admission medications    Medication Sig Start Date End Date Taking?  Authorizing Provider   amLODIPine (NORVASC) 10 MG tablet TAKE 1 TABLET BY MOUTH DAILY 22   Historical Provider, MD   atenolol (TENORMIN) 100 MG tablet TAKE 1 TABLET BY MOUTH EVERY DAY 22   Historical Provider, MD   aspirin 81 MG chewable tablet Take 81 mg by mouth daily    Historical Provider, MD   potassium chloride (KLOR-CON M) 20 MEQ TBCR extended release tablet TAKE 1 TABLET BY MOUTH TWICE DAILY 22   Historical Provider, MD   Multiple Vitamins-Minerals (CENTRUM SILVER ULTRA WOMENS PO) Take by mouth    Historical Provider, MD   vitamin D3 (CHOLECALCIFEROL) 400 UNITS TABS tablet Take 400 Units by mouth daily    Historical Provider, MD   cyclobenzaprine (FLEXERIL) 10 MG tablet Take 1 tablet by mouth 2 times daily as needed for Muscle spasms 7/10/15   Maxine Barragan MD   triamterene-hydrochlorothiazide [facility]) 37.5-25 MG per tablet TAKE 1 TABLET BY MOUTH DAILY 7/10/15   Maxine Barragan MD   PARoxetine (PAXIL) 20 MG tablet Take 1 tablet by mouth every morning 7/10/15   Maxine Barragan MD   calcium carbonate-vitamin D 600-200 MG-UNIT TABS Take 1 tablet by mouth daily 7/10/15   Maxine Barragan MD   ferrous sulfate 325 (65 FE) MG tablet Take 1 tablet by mouth daily (with breakfast) 7/10/15   Maxine Barragan MD       Current medications:    Current Facility-Administered Medications   Medication Dose Route Frequency Provider Last Rate Last Admin    lactated ringers infusion   IntraVENous Continuous Earline Bautista MD        ceFAZolin (ANCEF) 2,000 mg in sodium chloride 0.9 % 50 mL IVPB (mini-bag)  2,000 mg IntraVENous Once Enid Rosas MD        ortho mix (with morphine) injection   Injection On Call Enid Rosas MD        tranexamic acid (CYKLOKAPRON) 1,000 mg in sodium chloride 0.9 % 60 mL IVPB  1,000 mg IntraVENous Once Enid Rosas MD        lidocaine PF 1 % injection 1 mL  1 mL IntraDERmal Once PRN Heather Hamilton MD        lactated ringers infusion   IntraVENous Continuous Heather Hamilton  mL/hr at 11/30/22 0715 NoRateChange at 11/30/22 0715    sodium chloride flush 0.9 % injection 5-40 mL  5-40 mL IntraVENous 2 times per day Heather Hamilton MD        sodium chloride flush 0.9 % injection 5-40 mL  5-40 mL IntraVENous PRN Heather Hamilton MD        0.9 % sodium chloride infusion   IntraVENous PRN Heather Hamilton MD           Allergies: Allergies   Allergen Reactions    Lisinopril Swelling    Metronidazole Rash       Problem List:    Patient Active Problem List   Diagnosis Code    Essential hypertension, benign I10    Anxiety state F41.1    Chronic pain syndrome G89.4    Generalized osteoarthrosis, involving multiple sites M15.9    Chronic shoulder pain M25.519, G89.29    Rotator cuff tear M75.100    Cyst of mouth K09.8    Anemia D64.9    Osteopenia M85.80    Vitamin D deficiency E55.9    Left shoulder pain M25.512       Past Medical History:        Diagnosis Date    Anemia     IRON DEFICIENCY    Anxiety     Chronic pain     Depression     Hemorrhoids     Hypertension     OA (osteoarthritis)     Varicose veins        Past Surgical History:        Procedure Laterality Date    COLONOSCOPY      JOINT REPLACEMENT Right     KNEE ARTHROSCOPY      RIGHT    SHOULDER ARTHROSCOPY Right 03/06/2013    ROTATOR CUFF TEAR.  SUPERIOR LABRUM ANTERIOR POSTERIOR TEAR    TOTAL KNEE ARTHROPLASTY         Social History:    Social History     Tobacco Use    Smoking status: Never    Smokeless tobacco: Never   Substance Use Topics  Alcohol use: No                                Counseling given: Not Answered      Vital Signs (Current):   Vitals:    11/28/22 0840 11/30/22 0601   BP:  (!) 158/86   Pulse:  56   Resp:  15   Temp:  96.9 °F (36.1 °C)   TempSrc:  Temporal   SpO2:  98%   Weight: 180 lb (81.6 kg) 180 lb (81.6 kg)   Height: 5' 2\" (1.575 m) 5' 2\" (1.575 m)                                              BP Readings from Last 3 Encounters:   11/30/22 (!) 158/86   08/15/16 115/69   04/15/15 130/80       NPO Status: Time of last liquid consumption: 0500 (sip of water with meds)                        Time of last solid consumption: 2200                        Date of last liquid consumption: 11/30/22                        Date of last solid food consumption: 11/29/22    BMI:   Wt Readings from Last 3 Encounters:   11/30/22 180 lb (81.6 kg)   08/15/16 180 lb 12.4 oz (82 kg)   04/15/15 177 lb (80.3 kg)     Body mass index is 32.92 kg/m².     CBC:   Lab Results   Component Value Date/Time    WBC 5.9 04/15/2015 03:14 PM    RBC 3.75 04/15/2015 03:14 PM    HGB 10.9 04/15/2015 03:14 PM    HCT 32.8 04/15/2015 03:14 PM    MCV 87.4 04/15/2015 03:14 PM    RDW 15.8 04/15/2015 03:14 PM     04/15/2015 03:14 PM       CMP:   Lab Results   Component Value Date/Time     04/15/2015 03:14 PM    K 3.5 04/15/2015 03:14 PM    CL 97 04/15/2015 03:14 PM    CO2 27 04/15/2015 03:14 PM    BUN 15 04/15/2015 03:14 PM    CREATININE 1.3 04/15/2015 03:14 PM    GFRAA 49 04/15/2015 03:14 PM    GFRAA >60 04/16/2013 11:53 AM    AGRATIO 1.3 04/15/2015 03:14 PM    LABGLOM 41 04/15/2015 03:14 PM    GLUCOSE 81 04/15/2015 03:14 PM    PROT 8.1 04/15/2015 03:14 PM    PROT 7.2 01/14/2013 09:38 AM    CALCIUM 9.3 04/15/2015 03:14 PM    BILITOT 0.3 04/15/2015 03:14 PM    ALKPHOS 64 04/15/2015 03:14 PM    AST 22 04/15/2015 03:14 PM    ALT 15 04/15/2015 03:14 PM       POC Tests: No results for input(s): POCGLU, POCNA, POCK, POCCL, POCBUN, POCHEMO, POCHCT in the last 72 hours.    Coags: No results found for: PROTIME, INR, APTT    HCG (If Applicable): No results found for: PREGTESTUR, PREGSERUM, HCG, HCGQUANT     ABGs: No results found for: PHART, PO2ART, SCJ2VMN, ZDF8QQU, BEART, [id number]     Type & Screen (If Applicable):  No results found for: LABABO, LABRH    Drug/Infectious Status (If Applicable):  No results found for: HIV, HEPCAB    COVID-19 Screening (If Applicable): No results found for: COVID19        Anesthesia Evaluation  Patient summary reviewed and Nursing notes reviewed no history of anesthetic complications:   Airway: Mallampati: II  TM distance: >3 FB   Neck ROM: full  Mouth opening: > = 3 FB   Dental:    (+) upper dentures and lower dentures      Pulmonary:Negative Pulmonary ROS                              Cardiovascular:    (+) hypertension:, hyperlipidemia                  Neuro/Psych:   (+) depression/anxiety             GI/Hepatic/Renal:             Endo/Other:                     Abdominal:             Vascular: Other Findings:           Anesthesia Plan      general     ASA 1    (79-year-old female presents for RIGHT KNEE REVISION, POLY EXCHANGE. Plan general anesthesia with ASA standard monitors. Questions answered. Patient agreeable with anesthetic plan.  )  Induction: intravenous. Anesthetic plan and risks discussed with patient. Plan discussed with CRNA.     Attending anesthesiologist reviewed and agrees with Jose Boswell MD   11/30/2022

## 2022-11-30 NOTE — DISCHARGE INSTRUCTIONS
Ray Moses MD  [place] Office: [address] [facility], [address], [address] ([phone]) Gary Barrera [place] Discharge Instructions  For daytime questions or concerns, please call my clinic: [phone]  For urgent questions after hours call or text me on my cell: [phone]  If I am unavailable, you can still call the clinic number which will connect you to the on call physician. Please take a baby Aspirin 81mg twice daily for 30 days to prevent blood clots  Weight bearing as tolerated  Resume regular diet    Dressing/Wound Care: May remove outer dressing after 72 hours and replace with a clean dressing  Leave purple skin glue in place  May shower after 72 hours but do NOT submerge (I.e. no hot tubs, tub baths, pools, lakes, ocean ect). Avoid contact with dirty water. You may cover with a dry dressing or leave it open to the air. Keep it clean. Do not apply any cream or lotion to your incision  Please call immediately if any increasing redness or drainage, any fevers. For pain use the following combinations of medications. They work best together and are safe to take in combination as outlined below  -  Remember that swelling increases pain. When able, elevate and use ice to help with swelling  -  Acetaminophen (Tylenol) - this is OK to take in addition to an anti-inflammatory. - Maximum dosing of 1000mg every 6-8 hours (max 3000mg per day)       - Avoid alcohol use as directed on the bottle  -  Anti-inflammatory options - select only one type of anti-inflammatory. All work equally well.  Use what you have available at home.       - Note: avoid this category of medications if you are taking blood thinners, have a history of stomach ulcers, or kidney disease       - Diclofenac - max dose 75mg twice daily (50mg if age over 72)       - Meloxicam - max dose up to 15mg daily (7.5mg if age over 72)       - Ibuprofen (motrin) - max dose up to 800mg every 8 hours (400mg if age over 65)       - Naproxen (aleve) - max dose up to 500mg every 12 hours (250mg if age over 72)  -  Opioid pain medication (oxycodone, hydrocodone, hydromorphone, etc)       - These are strongest but also have the most side effects. Use the smallest dose that is effective. - Do NOT take more than prescribed unless directed to do so. - As pain improves, take less pills and less often. Wean off ASAP       - Some narcotics such as percocet and vicodin also contain acetaminophen. - Please check the label to ensure you do not go over your max dose on acetaminophen       - Do not operate machinery while taking narcotic pain medications       - These medications all cause constipation. Take a stool softener as needed such as colace  - It is unrealistic to expect to be completely pain free after surgery. The goal is to get you to a manageable level where you are able to sleep and function during your day  - Please call me if you are struggling with acceptable pain control and we will troubleshoot    Follow up with me 10-14 days after surgery for wound check and suture removal.    Dr. Michael Miller MD   [facility]  [address] [place] [address], [address], [address]  Office: [phone]  Cell: [phone]               [address] [place] [address]    There are potential side effects of anesthesia or sedation you may experience for the first 24 hours. These side effects include:    Confusion or Memory loss, Dizziness, or Delayed Reaction Times   [x]A responsible person should be with you for the next 24 hours. Do not operate any vehicles (automobiles, bicycles, motorcycles) or power tools or machinery for 24 hours. Do not sign any legal documents or make any legal decisions for 24 hours. Do not drink alcohol for 24 hours or while taking narcotic pain medication.       Nausea    [x]Start with light diet and progress to your normal diet as you feel like eating. However, if you experience nausea or repeated episodes of vomiting which persist beyond 12-24 hours, notify your physician. Once nausea has passed, remember to keep drinking fluids. Difficulty Passing Urine  [x]Drink extra amounts of fluid today. Notify your physician if you have not urinated within 8 hours after your procedure or you feel uncomfortable. Irritated Throat from a Breathing Tube  [x]Drink extra amounts of fluid today. Lozenges may help. Muscle Aches  [x]You may experience some generalized body aches as your muscles recover from medications used to relax them during surgery. These will gradually subside. ACTIVITY INSTRUCTIONS:  [x]Rest today. Increase activity as tolerated. []No heavy lifting or strenuous activity  [x]No driving FOR 24 HOURS, WHILE TAKING NARCOTIC PAIN MEDICATION OR UNTIL CLEARED TO DO SO BY YOUR PHYSICIAN  []Elevate operative limb  []Sling to operative limb  []Use Crutches  []Use Walker  [x]Weight bearing: []None / []Partial /  [x]Full as tolerated  [x]Other: FOLLOW ALL ABOVE PHYSICIAN SPECIFIC DISCHARGE INSTRUCTIONS    WOUND DRESSING INSTRUCTIONS:  [x]May shower (IN 72 HOURS AFTER YOU HAVE REMOVED THE DRY DRESSING AND ACE WRAP. LEAVE ANYTHING ADHERED TO YOUR SKIN IN PLACE)  []May bathe  []Keep dressing dry  []Do not remove dressing  [x]Remove dressing on: IN 67 HOURS BUT ONLY THE DRY DRESSING AND ACE WRAP. REPLACE WITH CLEAN DRESSING  []Leave steri strips in place  [x]Derma Ramires dressing -Do Not apply liquid or ointment medications or any other product to your wound while the adhesive is in place. These may loosen the film before your wound is healed. You may occasionally and briefly wet your wound in the shower. After showering, gently blot you wound dry with a soft towel.   []Drain Care  [x]Ice to operative site for 15-30 minutes of each hour while awake for 24-36 hours  []Use cooling system as instructed  []Post-op shoe-wear when up and about  []Other-Wear bra continuously for 24-36 hours  []Other:    MEDICATION INSTRUCTIONS:  Prescription(S) x 4    sent with you. Use as directed. When taking pain medications, you may experience the side effect of dizziness or drowsiness. Do not drink alcohol or drive when taking these medications. [x]Give the list of your medications to your primary care physician on your next visit. Keep your med list updated and carry it with in case of emergencies. Narcotic pain medications can cause the side effect of significant constipation. You may want to add a stool softener to your postoperative medication schedule or speak to your surgeon on how best to manage this side effect. NARCOTIC SAFETY:  Your pain medicine is only for you to take. Safely store your medicines. Store pills up high and out of reach of children and pets. Ensure safety caps are snapped tightly  Keep track of how many pills you have left    Unused medication can be disposed of by taking them to a drop-off box or take-back program that is authorized by the [facility]. Access to a site near you can be found on the [facility] [employer] website ([facility] [employer] [facility] [employer]). If you have a CPAP machine, it is very important that you use it daily during all periods of sleep and daytime rest during your recovery at home. Surgery and Anesthesia place a significant amount of stress on your body. Using your CPAP will help keep you safe and lessen the negative effects of that stress. FOLLOW-UP RECOVERY CARE:  [x]Call the office at [phone] FOR FOLLOW-UP APPOINTMENT TO BE SEEN IN 10-14 DAYS IF NOT PREVIOUSLY SCHEDULED OR FOR ANY QUESTIONS/CONCERNS THAT ARISE PRIOR TO THAT APPOINTMENT    Watch for these possible complications or symptoms:   Signs of INFECTION   > Fever over 100.4°     > Redness, swelling, hardness or warmth at the operative site   >Foul smelling or cloudy drainage at the operative site   Blood soaked dressing.   (Some oozing may be normal)  Numb, pale, blue, cold or tingling extremity    Notify your physician if they occur or you have prolonged anesthesia/sedation side effects. Date/time 11/30/2022 9:52 AM         PACU:  [phone]   M-F 700 AM - 7 PM      SAME DAY SERVICES:  [phone] M-F 7AM-6PM          PACU:  [phone]      SAME DAY SERVICES:  [phone]      (M-F 8am - 8pm)                    (M-F 6am - 6pm)        If you smoke STOP. We care about your health! FAQs  (frequently asked questions)  About Surgical Site Infections    What is a Surgical Site Infection (SSI)? A surgical site infection is an infection that occurs after surgery in the part of the body where the surgery took place. Most patients who have surgery do not develop an infection. However, infections develop in about 1 to 3 out of every 100 patients who have surgery. Some common symptoms of a surgical site infection are:   Redness and pain around the area where you had surgery  Drainage of cloudy fluid from your surgical wound   Fever    Can SSIs be treated? Yes. Most surgical site infections can be treated with antibiotics. The antibiotic given to you depends on the bacteria (germs) causing the infection. Sometimes patients with SSIs also need another surgery to treat the infection. What are some of the things that hospitals are doing to prevent SSIs? To prevent SSIs, doctors, nurses and other healthcare providers:   Clean their hands and arms up to their elbows with an antiseptic agent just before the surgery. Clean their hands with soap and water or an alcohol-based hand rub before and after caring for each patient. May remove some of your hair immediately before your surgery using electric clippers if the hair is in the same area where the procedure will occur. They should not shave you with a razor. Wear special hair covers, masks, gowns, and gloves during surgery to keep the surgery area clean.   Give you antibiotics before your surgery starts. In most cases, you should get antibiotics within 60 minutes before the surgery starts and the antibiotics should be stopped within 24 hours after surgery. Clean the skin at the site of your surgery with a special soap that kills germs. What can I do to help prevent SSIs? Before you surgery:  Tell your doctor about other medical problems you may have. Health problems such as allergies, diabetes, and obesity could affect your surgery and your treatment. Quit smoking. Patients who smoke get more infections. Talk to your doctor about how you can quit before your surgery. Do not shave near where you will have surgery. Shaving with a razor can irritate your skin and make it easier to develop an infection. At the time of your surgery:  Speak up if someone tries to shave you with a razor before surgery. Ask why you need to be shaved and talk with your surgeon if you have any concerns. Ask if you will get antibiotics before surgery. After your surgery:  Make sure that your healthcare providers clean their hands before examining you, either with soap and water or an alcohol-based hand rub. IF YOU DO NOT SEE YOUR PROVIDERS CLEAN THEIR HANDS, PLEASE ASK THEM TO DO SO. Family and friends who visit you should not touch the surgical wound or dressings. Family and friends should clean their hands with soap and water or an alcohol-based hand rub before and after visiting you. If you do not see them clean their hands, ask them to clean their hands. What do I need to do when I go home from the hospital?  Before you go home, your doctor nurses should explain everything you need to know about taking care of your wound. Make sure you understand how to care for your wound before you leave the hospital.    Always clean your hands before and after caring for your wound.     Before you go home, make sure you know who to contact if you have questions or problems after you get home. If you have any symptoms of an infection, such as redness and pain at the surgery site, drainage, or fever, call your doctor immediately. If you have additional questions, please ask your doctor or nurse. Thank you for allowing us to care for you. We hope we have exceeded your expectations and provided a very good overall experience while taking care of you and your family. If you have additional questions, please ask your doctor or nurse. Thank you for allowing us to care for you. We hope we have exceeded your expectations and provided a very good overall experience while taking care of you and your family.

## 2022-12-01 ENCOUNTER — TELEPHONE (OUTPATIENT)
Dept: ORTHOPEDIC SURGERY | Age: 77
End: 2022-12-01

## 2022-12-09 ENCOUNTER — TELEPHONE (OUTPATIENT)
Dept: ORTHOPEDIC SURGERY | Age: 77
End: 2022-12-09

## (undated) DEVICE — BLANKET WRM W29.9XL79.1IN UP BODY FORC AIR MISTRAL-AIR

## (undated) DEVICE — CUFF RESTRN WRST OR ANK 45FT AD FOAM

## (undated) DEVICE — DUAL CUT SAGITTAL BLADE

## (undated) DEVICE — 3M™ COBAN™ NL STERILE NON-LATEX SELF-ADHERENT WRAP, 2086S, 6 IN X 5 YD (15 CM X 4,5 M), 12 ROLLS/CASE: Brand: 3M™ COBAN™

## (undated) DEVICE — SYSTEM SKIN CLSR 22CM DERMBND PRINEO

## (undated) DEVICE — 3M™ TEGADERM™ TRANSPARENT FILM DRESSING FRAME STYLE, 1627, 4 IN X 10 IN (10 CM X 25 CM), 20/CT 4CT/CASE: Brand: 3M™ TEGADERM™

## (undated) DEVICE — KIT INT FIX FEM TIB CKPT MAKOPLASTY

## (undated) DEVICE — SOLUTION IV IRRIG WATER 1000ML POUR BRL 2F7114

## (undated) DEVICE — ZIMMER® STERILE DISPOSABLE TOURNIQUET CUFF WITH PLC, SINGLE PORT, SINGLE BLADDER, 34 IN. (86 CM)

## (undated) DEVICE — KIT TRK KNEE PROC VIZADISC

## (undated) DEVICE — SOLUTION IV 1000ML 0.9% SOD CHL

## (undated) DEVICE — BOWL AND CEMENT CARTRIDGE WITH BREAKAWAY FEMORAL NOZZLE AND MEDIUM PRESSURIZER: Brand: ACM

## (undated) DEVICE — ELECTRODE PT RET AD L9FT HI MOIST COND ADH HYDRGEL CORDED

## (undated) DEVICE — SUTURE PDS II SZ 2-0 L18IN ABSRB VLT SH L26MM 1/2 CIR TAPR Z775D

## (undated) DEVICE — KIT DRP FOR RIO ROBOTIC ARM ASST SYS

## (undated) DEVICE — SUTURE MCRYL SZ 4-0 L27IN ABSRB UD L19MM PS-2 1/2 CIR PRIM Y426H

## (undated) DEVICE — BOOT POS LEG DEMAYO

## (undated) DEVICE — UNDERGLOVE SURG SZ 8 BLU LTX FREE SYN POLYISOPRENE POLYMER

## (undated) DEVICE — PIN BNE FIX L110MM DIA32MM

## (undated) DEVICE — SUTURE PDS II SZ 2-0 L27IN ABSRB VLT L26MM CT-2 1/2 CIR Z333H

## (undated) DEVICE — CORD RETRCT SIL

## (undated) DEVICE — TOWEL,OR,DSP,ST,BLUE,DLX,8/PK,10PK/CS: Brand: MEDLINE

## (undated) DEVICE — TOTAL KNEE: Brand: MEDLINE INDUSTRIES, INC.

## (undated) DEVICE — 3 BONE CEMENT MIXER: Brand: MIXEVAC

## (undated) DEVICE — GLOVE ORTHO 7 1/2   MSG9475

## (undated) DEVICE — 3M™ IOBAN™ 2 ANTIMICROBIAL INCISE DRAPE 6648EZ: Brand: IOBAN™ 2

## (undated) DEVICE — SST BUR, WIRE PASS DRILL, 2 FLUTES, MED., 2MM DIA.: Brand: MICROAIRE®

## (undated) DEVICE — 3M™ STERI-DRAPE™ INSTRUMENT POUCH 1018: Brand: STERI-DRAPE™

## (undated) DEVICE — DRESSING THERABOND 3D ANTIMIC CNTCT SYS 15INCHX10INCH

## (undated) DEVICE — DRESSING HYDROFIBER AQUACEL AG ADVANTAGE 3.5X10 IN

## (undated) DEVICE — SUTURE PDS II SZ 0 L18IN ABSRB VLT L36MM CT-1 1/2 CIR Z740D

## (undated) DEVICE — APPLICATOR MEDICATED 26 CC SOLUTION HI LT ORNG CHLORAPREP

## (undated) DEVICE — COVER,TABLE,HEAVY DUTY,77"X90",STRL: Brand: MEDLINE

## (undated) DEVICE — SOLUTION IV IRRIG 0.9% NACL 3000ML BAG 2B7477

## (undated) DEVICE — SPONGE,LAP,18"X18",DLX,XR,ST,5/PK,40/PK: Brand: MEDLINE

## (undated) DEVICE — SYRINGE CATH TIP 50ML

## (undated) DEVICE — SUTURE PDS II SZ 0 L27IN ABSRB VLT L36MM CT-1 1/2 CIR Z340H

## (undated) DEVICE — PIN BNE FIX TEMP L140MM DIA4MM MAKO

## (undated) DEVICE — PENCIL ELECSURG HND CTRL ALL IN 1 MONOPOLAR LAP

## (undated) DEVICE — SUTURE STRATAFIX SPRL SZ 1 L14IN ABSRB VLT L48CM CTX 1/2 SXPD2B405

## (undated) DEVICE — TOWEL,STOP FLAG GOLD N-W: Brand: MEDLINE

## (undated) DEVICE — COUNTER NDL 40 COUNT HLD 70 NUM FOAM BLK SGL MAG W BLDE REMV

## (undated) DEVICE — FLUID TRAP FOR MINIVAC ES EQUIP FLD TRAP

## (undated) DEVICE — BLADE SURG SAW STD S STL OSC W/ SERR EDGE DISP